# Patient Record
Sex: FEMALE | Race: WHITE | Employment: FULL TIME | URBAN - METROPOLITAN AREA
[De-identification: names, ages, dates, MRNs, and addresses within clinical notes are randomized per-mention and may not be internally consistent; named-entity substitution may affect disease eponyms.]

---

## 2017-06-26 PROBLEM — R43.0 ANOSMIA: Status: ACTIVE | Noted: 2017-06-26

## 2017-07-10 PROCEDURE — 86235 NUCLEAR ANTIGEN ANTIBODY: CPT | Performed by: OTHER

## 2017-07-10 PROCEDURE — 82746 ASSAY OF FOLIC ACID SERUM: CPT | Performed by: OTHER

## 2017-07-10 PROCEDURE — 82607 VITAMIN B-12: CPT | Performed by: OTHER

## 2018-01-30 ENCOUNTER — PATIENT OUTREACH (OUTPATIENT)
Dept: FAMILY MEDICINE CLINIC | Facility: CLINIC | Age: 53
End: 2018-01-30

## 2018-03-14 ENCOUNTER — TELEPHONE (OUTPATIENT)
Dept: FAMILY MEDICINE CLINIC | Facility: CLINIC | Age: 53
End: 2018-03-14

## 2018-03-14 NOTE — TELEPHONE ENCOUNTER
called pt to find out if he/she would like to continue care with Dr Pancho Payton .  Unable to LM . busy signal

## 2025-06-06 ENCOUNTER — HOSPITAL ENCOUNTER (OUTPATIENT)
Facility: HOSPITAL | Age: 60
Setting detail: HOSPITAL OUTPATIENT SURGERY
Discharge: HOME | End: 2025-06-06
Attending: OTOLARYNGOLOGY | Admitting: OTOLARYNGOLOGY
Payer: COMMERCIAL

## 2025-06-06 ENCOUNTER — ANESTHESIA (OUTPATIENT)
Dept: SURGERY | Facility: HOSPITAL | Age: 60
End: 2025-06-06
Payer: COMMERCIAL

## 2025-06-06 ENCOUNTER — ANESTHESIA EVENT (OUTPATIENT)
Dept: SURGERY | Facility: HOSPITAL | Age: 60
End: 2025-06-06
Payer: COMMERCIAL

## 2025-06-06 VITALS
HEIGHT: 61 IN | DIASTOLIC BLOOD PRESSURE: 64 MMHG | WEIGHT: 138 LBS | BODY MASS INDEX: 26.06 KG/M2 | SYSTOLIC BLOOD PRESSURE: 130 MMHG | TEMPERATURE: 98 F | RESPIRATION RATE: 16 BRPM | OXYGEN SATURATION: 100 % | HEART RATE: 63 BPM

## 2025-06-06 DIAGNOSIS — S02.2XXA CLOSED FRACTURE OF NASAL BONE, INITIAL ENCOUNTER: Primary | ICD-10-CM

## 2025-06-06 DIAGNOSIS — R43.0 ANOSMIA: ICD-10-CM

## 2025-06-06 RX ORDER — HYDROMORPHONE HYDROCHLORIDE 1 MG/ML
0.4 INJECTION, SOLUTION INTRAMUSCULAR; INTRAVENOUS; SUBCUTANEOUS EVERY 5 MIN PRN
Refills: 0 | Status: DISCONTINUED | OUTPATIENT
Start: 2025-06-06 | End: 2025-06-06

## 2025-06-06 RX ORDER — HYDROCODONE BITARTRATE AND ACETAMINOPHEN 5; 325 MG/1; MG/1
1 TABLET ORAL ONCE AS NEEDED
Refills: 0 | Status: DISCONTINUED | OUTPATIENT
Start: 2025-06-06 | End: 2025-06-06

## 2025-06-06 RX ORDER — HYDROMORPHONE HYDROCHLORIDE 1 MG/ML
0.2 INJECTION, SOLUTION INTRAMUSCULAR; INTRAVENOUS; SUBCUTANEOUS EVERY 5 MIN PRN
Refills: 0 | Status: DISCONTINUED | OUTPATIENT
Start: 2025-06-06 | End: 2025-06-06

## 2025-06-06 RX ORDER — ONDANSETRON 2 MG/ML
INJECTION INTRAMUSCULAR; INTRAVENOUS AS NEEDED
Status: DISCONTINUED | OUTPATIENT
Start: 2025-06-06 | End: 2025-06-06 | Stop reason: SURG

## 2025-06-06 RX ORDER — ACETAMINOPHEN 500 MG
1000 TABLET ORAL ONCE
Status: DISCONTINUED | OUTPATIENT
Start: 2025-06-06 | End: 2025-06-06 | Stop reason: HOSPADM

## 2025-06-06 RX ORDER — ONDANSETRON 2 MG/ML
4 INJECTION INTRAMUSCULAR; INTRAVENOUS EVERY 6 HOURS PRN
Status: DISCONTINUED | OUTPATIENT
Start: 2025-06-06 | End: 2025-06-06

## 2025-06-06 RX ORDER — HYDROCODONE BITARTRATE AND ACETAMINOPHEN 5; 325 MG/1; MG/1
1 TABLET ORAL EVERY 4 HOURS PRN
Qty: 20 TABLET | Refills: 0 | Status: SHIPPED | OUTPATIENT
Start: 2025-06-06

## 2025-06-06 RX ORDER — SODIUM CHLORIDE, SODIUM LACTATE, POTASSIUM CHLORIDE, CALCIUM CHLORIDE 600; 310; 30; 20 MG/100ML; MG/100ML; MG/100ML; MG/100ML
INJECTION, SOLUTION INTRAVENOUS CONTINUOUS
Status: DISCONTINUED | OUTPATIENT
Start: 2025-06-06 | End: 2025-06-06

## 2025-06-06 RX ORDER — SCOPOLAMINE 1 MG/3D
1 PATCH, EXTENDED RELEASE TRANSDERMAL ONCE
Status: DISCONTINUED | OUTPATIENT
Start: 2025-06-06 | End: 2025-06-06 | Stop reason: HOSPADM

## 2025-06-06 RX ORDER — NALOXONE HYDROCHLORIDE 0.4 MG/ML
0.08 INJECTION, SOLUTION INTRAMUSCULAR; INTRAVENOUS; SUBCUTANEOUS AS NEEDED
Status: DISCONTINUED | OUTPATIENT
Start: 2025-06-06 | End: 2025-06-06

## 2025-06-06 RX ORDER — LIDOCAINE HYDROCHLORIDE 10 MG/ML
INJECTION, SOLUTION EPIDURAL; INFILTRATION; INTRACAUDAL; PERINEURAL AS NEEDED
Status: DISCONTINUED | OUTPATIENT
Start: 2025-06-06 | End: 2025-06-06 | Stop reason: SURG

## 2025-06-06 RX ORDER — DEXAMETHASONE SODIUM PHOSPHATE 4 MG/ML
VIAL (ML) INJECTION AS NEEDED
Status: DISCONTINUED | OUTPATIENT
Start: 2025-06-06 | End: 2025-06-06 | Stop reason: SURG

## 2025-06-06 RX ORDER — PROCHLORPERAZINE EDISYLATE 5 MG/ML
5 INJECTION INTRAMUSCULAR; INTRAVENOUS EVERY 8 HOURS PRN
Status: DISCONTINUED | OUTPATIENT
Start: 2025-06-06 | End: 2025-06-06

## 2025-06-06 RX ORDER — ACETAMINOPHEN 500 MG
1000 TABLET ORAL ONCE AS NEEDED
Status: DISCONTINUED | OUTPATIENT
Start: 2025-06-06 | End: 2025-06-06

## 2025-06-06 RX ORDER — OXYMETAZOLINE HYDROCHLORIDE 0.05 G/100ML
SPRAY NASAL AS NEEDED
Status: DISCONTINUED | OUTPATIENT
Start: 2025-06-06 | End: 2025-06-06 | Stop reason: HOSPADM

## 2025-06-06 RX ORDER — HYDROMORPHONE HYDROCHLORIDE 1 MG/ML
0.6 INJECTION, SOLUTION INTRAMUSCULAR; INTRAVENOUS; SUBCUTANEOUS EVERY 5 MIN PRN
Refills: 0 | Status: DISCONTINUED | OUTPATIENT
Start: 2025-06-06 | End: 2025-06-06

## 2025-06-06 RX ORDER — HYDROCODONE BITARTRATE AND ACETAMINOPHEN 5; 325 MG/1; MG/1
2 TABLET ORAL ONCE AS NEEDED
Refills: 0 | Status: DISCONTINUED | OUTPATIENT
Start: 2025-06-06 | End: 2025-06-06

## 2025-06-06 RX ORDER — DIPHENHYDRAMINE HYDROCHLORIDE 50 MG/ML
12.5 INJECTION, SOLUTION INTRAMUSCULAR; INTRAVENOUS AS NEEDED
Status: DISCONTINUED | OUTPATIENT
Start: 2025-06-06 | End: 2025-06-06

## 2025-06-06 RX ORDER — MIDAZOLAM HYDROCHLORIDE 1 MG/ML
1 INJECTION INTRAMUSCULAR; INTRAVENOUS EVERY 5 MIN PRN
Status: DISCONTINUED | OUTPATIENT
Start: 2025-06-06 | End: 2025-06-06

## 2025-06-06 RX ADMIN — LIDOCAINE HYDROCHLORIDE 100 MG: 10 INJECTION, SOLUTION EPIDURAL; INFILTRATION; INTRACAUDAL; PERINEURAL at 13:20:00

## 2025-06-06 RX ADMIN — DEXAMETHASONE SODIUM PHOSPHATE 8 MG: 4 MG/ML VIAL (ML) INJECTION at 13:24:00

## 2025-06-06 RX ADMIN — ONDANSETRON 4 MG: 2 INJECTION INTRAMUSCULAR; INTRAVENOUS at 13:24:00

## 2025-06-06 NOTE — ANESTHESIA PREPROCEDURE EVALUATION
PRE-OP EVALUATION    Patient Name: Lisa Parks    Admit Diagnosis: CLOSED FRACTURE OF NASAL BONE, INITIAL ENCOUNTER    Pre-op Diagnosis: CLOSED FRACTURE OF NASAL BONE, INITIAL ENCOUNTER    CLOSED REDUCTION NASAL FRACTURE    Anesthesia Procedure: CLOSED REDUCTION NASAL FRACTURE (Bilateral: Nose)    Surgeons and Role:     * Matias Panda MD - Primary    Pre-op vitals reviewed.        Body mass index is 26.83 kg/m².    Current medications reviewed.  Hospital Medications:  Current Medications[1]    Outpatient Medications:   Prescriptions Prior to Admission[2]    Allergies: Codeine      Anesthesia Evaluation    Patient summary reviewed.    Anesthetic Complications           GI/Hepatic/Renal                                 Cardiovascular                                                       Endo/Other                                  Pulmonary                           Neuro/Psych                                      Past Surgical History[3]  Social Hx on file[4]  History   Drug Use Unknown     Available pre-op labs reviewed.               Airway      Mallampati: II  Mouth opening: >3 FB  TM distance: > 6 cm  Neck ROM: full Cardiovascular    Cardiovascular exam normal.         Dental             Pulmonary    Pulmonary exam normal.                 Other findings              ASA: 1   Plan: general  NPO status verified and           Plan/risks discussed with: patient                Present on Admission:  **None**             [1]    acetaminophen (Tylenol Extra Strength) tab 1,000 mg  1,000 mg Oral Once    scopolamine (Transderm-Scop) 1 MG/3DAYS patch 1 patch  1 patch Transdermal Once    lactated ringers infusion   Intravenous Continuous   [2]   No medications prior to admission.   [3]   Past Surgical History:  Procedure Laterality Date    D & c     [4]   Social History  Socioeconomic History    Marital status:    Tobacco Use    Smoking status: Never    Smokeless tobacco: Never   Vaping Use    Vaping status:  Never Used   Substance and Sexual Activity    Alcohol use: Not Currently    Drug use: Never      Fluconazole Counseling:  Patient counseled regarding adverse effects of fluconazole including but not limited to headache, diarrhea, nausea, upset stomach, liver function test abnormalities, taste disturbance, and stomach pain.  There is a rare possibility of liver failure that can occur when taking fluconazole.  The patient understands that monitoring of LFTs and kidney function test may be required, especially at baseline. The patient verbalized understanding of the proper use and possible adverse effects of fluconazole.  All of the patient's questions and concerns were addressed.

## 2025-06-06 NOTE — OPERATIVE REPORT
[facility]    Jelani Parks Patient Status:  Hospital Outpatient Surgery    1965 MRN [id number]   Location [facility] PRE OP HOLDING Attending Matias Panda MD   Hosp Day # 0 PCP Allison Schnitzler, MD     Closed reduction of nasal fracture Op Note  Pre-Op Diagnosis: Nasal fracture  Post-Op Diagnosis: Same  Procedure: #1 Closed reduction of nasal fracture  Surgeon: Amarilys  Anesthesia: General  Indications for Procedure: jelani is a very pleasant female with a history of recent nasal fracture with significant deviation and obstruction. The above-named procedure was offered for definitive treatment.   Procedure in Detail: Patient taken to the operating room and laid supine on the operating table. After adequate IV and endotracheal anesthesia, the nose was assessed. This was measured to skull base with [place] elevator externally. That amount of elevator was placed intranasally and used to lever and cantilever the fracture back to its original position. A nasal splint was applied. All instruments were removed from the oral cavity and nose and the patient was given back to anesthesia and reversed without complications. The sponge, needle and instrument counts were correct at the end of the case. There were no complications. I performed all parts of this procedure.    EBL:  5cc  IVF:  100cc LR  Specimens:  None  UO: None  Condition:  To PACU stable    Matias Panda MD  2025  1:06 PM

## 2025-06-06 NOTE — ANESTHESIA POSTPROCEDURE EVALUATION
[facility]    Lisa Parks Patient Status:  Hospital Outpatient Surgery   Age/Gender 59 year old female MRN [id number]   Location [facility] PERIOPERATIVE SERVICE Attending Matias Panda MD   Hosp Day # 0 PCP Allison Schnitzler, MD       Anesthesia Post-op Note    CLOSED REDUCTION NASAL FRACTURE    Procedure Summary       Date: 06/06/25 Room / Location:  MAIN OR 03 / [facility] MAIN OR    Anesthesia Start: 1319 Anesthesia Stop: 1351    Procedure: CLOSED REDUCTION NASAL FRACTURE (Bilateral: Nose) Diagnosis: (CLOSED FRACTURE OF NASAL BONE, INITIAL ENCOUNTER)    Surgeons: Matias Pnada MD Anesthesiologist: Ulises Lee MD    Anesthesia Type: general ASA Status: 1            Anesthesia Type: general    Vitals Value Taken Time   /79 06/06/25 14:03   Temp 98.2 °F (36.8 °C) 06/06/25 13:38   Pulse 58 06/06/25 14:05   Resp 16 06/06/25 13:38   SpO2 100 % 06/06/25 14:05   Vitals shown include unfiled device data.        Patient Location: Same Day Surgery    Anesthesia Type: general    Airway Patency: patent    Postop Pain Control: adequate    Mental Status: preanesthetic baseline    Nausea/Vomiting: none    Cardiopulmonary/Hydration status: stable euvolemic    Complications: no apparent anesthesia related complications    Postop vital signs: stable    Dental Exam: Unchanged from Preop

## 2025-06-06 NOTE — DISCHARGE INSTRUCTIONS
Call [facility] at [phone] or if it is after hours ask to have the doctor on call paged if your child has:    POST-OPERATIVE INSTRUCTIONS (NASAL SURGERY).     You can expect to go home after the procedure unless other medical conditions complicate recovery.  You may have some silicone splints if you had septal surgery. Large packs or yards of gauze are not used except in unusual circumstances. These items are removed at your next scheduled office visit, which is generally one week following the operation. Full recovery may take a couple of weeks; however, you should be able to resume light activities within 2-3 days if not sooner.   Things to Expect  1. Bleeding from your nose is normal for 3-4 days. It may be heaviest during the first 24-48 hours and it may soak through the gauze. After this, it should slow down. If you are having heavy bleeding, are spitting up blood or feel faint, please notify us as soon as possible or call 911 if there is an emergency.  2. You may experience temporary numbness of the cheeks, upper lip, and upper teeth. This is usually temporary and should return to normal given time.  3. You may have a headache after surgery. However, if you experience severe headache associated with stiff neck, nausea, vomiting, confusion, or malaise, or anything else that worries you, call us as soon as possible or call 911.  4. Lying down flat may cause pressure to increase in your face; therefore, you may want to sleep with your head elevated  5. Dry blood, mucus and crusting in the nose will occur, and may result in cold-like or sinusitis-like symptoms. This is normal for two to three weeks after surgery. It is important to begin nasal irrigations with saline (salt water) solution starting the day of surgery.  6. You may experience a temporary loss of smell and taste; this should return when healing is complete.  Over the Counter Medications  1. You will need to purchase a bottle of Ocean  Spray or nasal saline at a local drug store.   2. Over the counter decongestant sprays such as Afrin (oxymetazoline) or Hira-Synephrine may help slow down bleeding in the first few days, but they should NOT be used beyond day 3.   THINGS TO DO  Take pain medicines as prescribed. Do not take any additional pain medications. Always follow the instructions on the medicine bottle. If the pain medication is not helping, please call us as soon as possible.  Take the antibiotics, if prescribed, according to instructions.  Take oral steroids, if prescribed, according to instructions.  Start Ocean Spray or saline irrigations on the day of surgery. You should try spray 2-3 sprays to both sides 4-6 times per day. This will keep the stents clean and help you heal faster. Sneeze or cough with your mouth open if needed  Eat a regular diet  Shower and wash as needed, but do not submerge your head in a tub or pool  Avoid air travel for 2 weeks following surgery  Take your pain medicines before your first post-operative visit  THINGS NOT TO DO  DO NOT perform any strenuous activity (gym, sports, weight lifting, sex) until your first post-operative visit and are cleared  DO NOT blow your nose or pick at your nose until cleared by your doctor  DO NOT attempt to remove any packing  DO NOT take aspirin or aspirin containing medicines, Advil, Motrin, Aleve, Excedrin, Bufferin or any other NSAIDS  DO NOT fly without your doctor’s clearance (or until 2 weeks following surgery)  Call Immediately if any of the following occur:   1. Change in vision  2. Neck stiffness or deep head pain  3. Continued nausea or vomiting  4. Bright red blood that lasts more than ten minutes or causes choking  5. Fever over 101 degrees    With any concerns or questions, or ANY bleeding, call and ask for the ENT on call physician

## 2025-06-06 NOTE — H&P
History and physical by Dr. Schnitzler on 6/4 reviewed and up to date. No changes to H&P.    Plan is closed reduction of nasal fracture

## 2025-06-06 NOTE — ANESTHESIA PROCEDURE NOTES
Airway  Date/Time: 6/6/2025 1:21 PM  Reason: elective      General Information and Staff   Patient location during procedure: OR  Anesthesiologist: Ulises Lee MD  Performed: anesthesiologist   Performed by: Ulises Lee MD  Authorized by: Ulises Lee MD        Indications and Patient Condition  Indications for airway management: anesthesia  Sedation level: deep      Preoxygenated: yesPatient position: sniffing    Mask difficulty assessment: 1 - vent by mask    Final Airway Details    Final airway type: supraglottic airway      Successful airway: classic  Size: 2.5     Number of attempts at approach: 1

## (undated) DEVICE — GLOVE SUR 7.5 SENSICARE PI PIP CRM PWD F

## (undated) DEVICE — Device: Brand: DENVER SPLINT

## (undated) DEVICE — SLEEVE COMPR MD KNEE LEN SGL USE KENDALL SCD

## (undated) DEVICE — SPONGE,NEURO,0.5"X3",XR,STRL,LF,10/PK: Brand: MEDLINE

## (undated) DEVICE — MEDI-VAC NON-CONDUCTIVE SUCTION TUBING: Brand: CARDINAL HEALTH

## (undated) DEVICE — BASIC PACK: Brand: CONVERTORS

## (undated) DEVICE — GAUZE - RF AND X-RAY DETECTABLE USP TYPE VII, 16 PLY: Brand: SITUATE

## (undated) DEVICE — SOLUTION IRRIG 1000ML 0.9% NACL USP BTL